# Patient Record
Sex: MALE | URBAN - METROPOLITAN AREA
[De-identification: names, ages, dates, MRNs, and addresses within clinical notes are randomized per-mention and may not be internally consistent; named-entity substitution may affect disease eponyms.]

---

## 2021-11-17 ENCOUNTER — OFFICE VISIT (OUTPATIENT)
Dept: URBAN - METROPOLITAN AREA CLINIC 64 | Facility: CLINIC | Age: 61
End: 2021-11-17
Payer: COMMERCIAL

## 2021-11-17 DIAGNOSIS — H25.813 COMBINED FORMS OF AGE-RELATED CATARACT, BILATERAL: Primary | ICD-10-CM

## 2021-11-17 PROCEDURE — 99204 OFFICE O/P NEW MOD 45 MIN: CPT | Performed by: OPTOMETRIST

## 2021-11-17 ASSESSMENT — INTRAOCULAR PRESSURE
OD: 16
OS: 15

## 2021-11-17 ASSESSMENT — KERATOMETRY
OS: 45.12
OD: 44.80

## 2021-11-17 ASSESSMENT — VISUAL ACUITY
OS: 20/25
OD: 20/30

## 2021-11-17 NOTE — IMPRESSION/PLAN
Impression: Combined forms of age-related cataract, bilateral: H25.813. Plan: Discussed cataract diagnosis with the patient. Discussed and reviewed treatment options for cataracts. Risks and benefits of surgical procedure were explained and understood. Patient elects surgical treatment. Schedule for consult with Dr. Titus Squires or Dr. Rosa Cook.

## 2021-12-01 ENCOUNTER — PRE-OPERATIVE VISIT (OUTPATIENT)
Dept: URBAN - METROPOLITAN AREA CLINIC 64 | Facility: CLINIC | Age: 61
End: 2021-12-01
Payer: COMMERCIAL

## 2021-12-01 DIAGNOSIS — H52.222 REGULAR ASTIGMATISM, LEFT EYE: ICD-10-CM

## 2021-12-01 PROCEDURE — 99204 OFFICE O/P NEW MOD 45 MIN: CPT | Performed by: OPHTHALMOLOGY

## 2021-12-01 ASSESSMENT — INTRAOCULAR PRESSURE
OS: 14
OD: 14

## 2021-12-01 NOTE — IMPRESSION/PLAN
Impression: Combined forms of age-related cataract, left eye: H25.812. Plan: OS does not qualify for surgery based on level of vision. Anticipate anisometropia due to high myopia. If anisometropia is problematic, plan to proceed with Cat Sx OS. See bilateral plan.

## 2021-12-01 NOTE — IMPRESSION/PLAN
Impression: Other vitreous opacities, left eye: H43.392. Plan: Discussed. No treatment recommended due to level of vision.

## 2021-12-01 NOTE — IMPRESSION/PLAN
Impression: Combined forms of age-related cataract, bilateral: H25.813. Plan: Discussed cataracts, treatment options, and surgical risks/benefits with patient. Patient elects surgical treatment. Recommend surgery OD, Aim OD: plano. When OS qualifies for surgery,  Aim OS: -0.25 Candidate for all advanced technologies. Recommend ORA. Recommend Pan Optix IOL OD, Pan Optix TORIC IOL OS, to reduce reliance on readers. Discussed surgical benefits/risks with patient of Trifocal lens. Discussed possibility of rings or halos around lights at night and likelihood of improvement over time with neuroadaptation. For Pan Optix IOL Aim OD: plano. When OS qualifies, Aim OS: plano. Note: Anticipate anisometropia after surgery OD due to high myopia. If anisometropia is problematic, plan to proceed with Cat Sx OS.

## 2022-01-21 ENCOUNTER — OFFICE VISIT (OUTPATIENT)
Dept: URBAN - METROPOLITAN AREA CLINIC 64 | Facility: CLINIC | Age: 62
End: 2022-01-21
Payer: COMMERCIAL

## 2022-01-21 DIAGNOSIS — H43.392 OTHER VITREOUS OPACITIES, LEFT EYE: ICD-10-CM

## 2022-01-21 PROCEDURE — 99214 OFFICE O/P EST MOD 30 MIN: CPT | Performed by: STUDENT IN AN ORGANIZED HEALTH CARE EDUCATION/TRAINING PROGRAM

## 2022-01-21 ASSESSMENT — INTRAOCULAR PRESSURE
OD: 13
OS: 13

## 2022-01-21 NOTE — IMPRESSION/PLAN
Impression: Open angle with borderline findings, low risk, bilateral: H40.013. Plan: Based on C/D ratio. Good IOP today. Recommend baseline glaucoma work up. Monitor. Continue care with referring OD.

## 2022-01-21 NOTE — IMPRESSION/PLAN
Impression: Regular astigmatism, left eye: H52.222. Plan: Waiting for Cat sx OS. Recommend TORIC IOL OS for best vision. Patient understands they will need glasses for full-time wear if astigmatism is not corrected.

## 2022-01-21 NOTE — IMPRESSION/PLAN
Impression: Combined forms of age-related cataract, bilateral: H25.813. Plan: Chart has been reviewed prior to seeing the patient and additional testing is necessary including A-scan/Lens Biometry. Discussed cataracts, treatment options, and surgical risks/benefits with patient including bleeding, infection, capsular break, glaucoma, corneal clouding. Patient understands there are tradeoffs to each intraocular lens choice and glasses may still be necessary after surgery. Pt understands that multifocal intraocular lenses have side effects including but not limited to Halos/Glare/Difficulty in Dim Lighting and Intermediate vision. The patient is bothered by the symptoms of his/her cataract which is not correctable with a change in glasses and their ADL's are impaired. Patient elects surgical treatment. Recommend ORA. Recommend Dexycu + Moxifloxacin (PF) Intracameral Injection. Lens Recommendation: Standard distance OD Technology: OK for [employer] Aim OD: -0.25. [employer]: OD Notes: Regular astigmatism recommend AM OD

## 2022-01-21 NOTE — IMPRESSION/PLAN
Impression: Combined forms of age-related cataract, left eye: H25.812. Plan: Anticipate anisometropia due to high myopia. If anisometropia is problematic, plan to proceed with Cat Sx OS. Recommend toric lens OS.

## 2022-01-28 ENCOUNTER — ADULT PHYSICAL (OUTPATIENT)
Dept: URBAN - METROPOLITAN AREA CLINIC 64 | Facility: CLINIC | Age: 62
End: 2022-01-28
Payer: COMMERCIAL

## 2022-01-28 DIAGNOSIS — Z01.818 ENCOUNTER FOR OTHER PREPROCEDURAL EXAMINATION: Primary | ICD-10-CM

## 2022-01-28 PROCEDURE — 99203 OFFICE O/P NEW LOW 30 MIN: CPT | Performed by: NURSE PRACTITIONER

## 2022-02-02 ENCOUNTER — SURGERY (OUTPATIENT)
Dept: URBAN - METROPOLITAN AREA SURGERY 42 | Facility: SURGERY | Age: 62
End: 2022-02-02
Payer: COMMERCIAL

## 2022-02-02 DIAGNOSIS — H52.221 REGULAR ASTIGMATISM, RIGHT EYE: ICD-10-CM

## 2022-02-02 PROCEDURE — PR1CP PR1CP: CUSTOM | Performed by: STUDENT IN AN ORGANIZED HEALTH CARE EDUCATION/TRAINING PROGRAM

## 2022-02-10 ENCOUNTER — POST-OPERATIVE VISIT (OUTPATIENT)
Dept: URBAN - METROPOLITAN AREA CLINIC 64 | Facility: CLINIC | Age: 62
End: 2022-02-10
Payer: COMMERCIAL

## 2022-02-10 DIAGNOSIS — Z48.810 ENCOUNTER FOR SURGICAL AFTERCARE FOLLOWING SURGERY ON A SENSE ORGAN: ICD-10-CM

## 2022-02-10 PROCEDURE — 99024 POSTOP FOLLOW-UP VISIT: CPT | Performed by: STUDENT IN AN ORGANIZED HEALTH CARE EDUCATION/TRAINING PROGRAM

## 2022-02-10 ASSESSMENT — VISUAL ACUITY
OS: 20/25
OD: 20/20

## 2022-02-10 ASSESSMENT — INTRAOCULAR PRESSURE
OD: 13
OS: 13

## 2022-02-10 NOTE — IMPRESSION/PLAN
Impression: Combined forms of age-related cataract, left eye: H25.812. Plan: Chart has been reviewed prior to seeing the patient and additional testing is necessary including A-scan/Lens Biometry. Discussed cataracts, treatment options, and surgical risks/benefits with patient including bleeding, infection, capsular break, glaucoma, corneal clouding. Patient understands there are tradeoffs to each intraocular lens choice and glasses may still be necessary after surgery. Pt understands that multifocal intraocular lenses have side effects including but not limited to Halos/Glare/Difficulty in Dim Lighting and Intermediate vision. The patient is bothered by the symptoms of his/her cataract which is not correctable with a change in glasses and their ADL's are impaired. Patient elects surgical treatment. Recommend ORA. Recommend Dexycu + Moxifloxacin (PF) Intracameral Injection. Lens Recommendation: Toric distance to match OS Technology: OK for ORA and [employer] Aim OS: -0.25.
OS qualifies based on anisometropia. Regular astigmatism OS, recommend toric lens for best surgical outcome.

## 2022-02-10 NOTE — IMPRESSION/PLAN
Impression: Regular astigmatism, left eye: H52.222. Plan: Recommend toric lens OS for best visual outcome.

## 2022-02-10 NOTE — IMPRESSION/PLAN
Impression: S/P Cataract Extraction by phacoemulsification with IOL placement; LRI (Limbal Relaxing Incision); ORA OD - 8 Days. Encounter for surgical aftercare following surgery on a sense organ  Z48.810. Plan: Okay for cat sx OS based on anisometropia.

## 2022-02-23 ENCOUNTER — SURGERY (OUTPATIENT)
Dept: URBAN - METROPOLITAN AREA SURGERY 42 | Facility: SURGERY | Age: 62
End: 2022-02-23
Payer: COMMERCIAL

## 2022-02-23 DIAGNOSIS — H40.013 OPEN ANGLE WITH BORDERLINE FINDINGS, LOW RISK, BILATERAL: ICD-10-CM

## 2022-02-23 DIAGNOSIS — H25.812 COMBINED FORMS OF AGE-RELATED CATARACT, LEFT EYE: Primary | ICD-10-CM

## 2022-02-23 PROCEDURE — PR1CC PR1CC: CUSTOM | Performed by: STUDENT IN AN ORGANIZED HEALTH CARE EDUCATION/TRAINING PROGRAM
